# Patient Record
Sex: MALE | Race: WHITE | NOT HISPANIC OR LATINO | URBAN - METROPOLITAN AREA
[De-identification: names, ages, dates, MRNs, and addresses within clinical notes are randomized per-mention and may not be internally consistent; named-entity substitution may affect disease eponyms.]

---

## 2020-07-31 ENCOUNTER — COMMUNICATION - HEALTHEAST (OUTPATIENT)
Dept: SCHEDULING | Facility: CLINIC | Age: 75
End: 2020-07-31

## 2021-06-16 PROBLEM — J93.9 PNEUMOTHORAX: Status: ACTIVE | Noted: 2020-07-28

## 2025-05-15 ENCOUNTER — APPOINTMENT (OUTPATIENT)
Dept: RADIOLOGY | Facility: HOSPITAL | Age: 80
End: 2025-05-15
Attending: EMERGENCY MEDICINE
Payer: COMMERCIAL

## 2025-05-15 ENCOUNTER — HOSPITAL ENCOUNTER (EMERGENCY)
Facility: HOSPITAL | Age: 80
Discharge: HOME | End: 2025-05-15
Attending: EMERGENCY MEDICINE | Admitting: EMERGENCY MEDICINE
Payer: COMMERCIAL

## 2025-05-15 VITALS
DIASTOLIC BLOOD PRESSURE: 57 MMHG | RESPIRATION RATE: 18 BRPM | TEMPERATURE: 97.5 F | WEIGHT: 195 LBS | BODY MASS INDEX: 27.3 KG/M2 | OXYGEN SATURATION: 97 % | HEIGHT: 71 IN | HEART RATE: 87 BPM | SYSTOLIC BLOOD PRESSURE: 103 MMHG

## 2025-05-15 DIAGNOSIS — T84.021A DISLOCATION OF INTERNAL LEFT HIP PROSTHESIS, INITIAL ENCOUNTER: ICD-10-CM

## 2025-05-15 PROBLEM — J93.9 PNEUMOTHORAX: Status: RESOLVED | Noted: 2020-07-28 | Resolved: 2025-05-15

## 2025-05-15 LAB
HOLD SPECIMEN: NORMAL

## 2025-05-15 PROCEDURE — 999N000157 HC STATISTIC RCP TIME EA 10 MIN

## 2025-05-15 PROCEDURE — 999N000065 XR HIP PORTABLE LEFT 2-3 VIEWS

## 2025-05-15 PROCEDURE — 73502 X-RAY EXAM HIP UNI 2-3 VIEWS: CPT | Mod: 77

## 2025-05-15 PROCEDURE — 99285 EMERGENCY DEPT VISIT HI MDM: CPT | Mod: 25

## 2025-05-15 PROCEDURE — 99152 MOD SED SAME PHYS/QHP 5/>YRS: CPT

## 2025-05-15 PROCEDURE — 999N000185 HC STATISTIC TRANSPORT TIME EA 15 MIN

## 2025-05-15 PROCEDURE — 999N000065 XR HIP PORT LEFT 1 VIEW

## 2025-05-15 PROCEDURE — 250N000011 HC RX IP 250 OP 636: Performed by: EMERGENCY MEDICINE

## 2025-05-15 PROCEDURE — 27250 TREAT HIP DISLOCATION: CPT | Mod: LT

## 2025-05-15 PROCEDURE — 73502 X-RAY EXAM HIP UNI 2-3 VIEWS: CPT

## 2025-05-15 RX ORDER — PROPOFOL 10 MG/ML
1 INJECTION, EMULSION INTRAVENOUS ONCE
Status: COMPLETED | OUTPATIENT
Start: 2025-05-15 | End: 2025-05-15

## 2025-05-15 RX ADMIN — PROPOFOL 89 MG: 10 INJECTION, EMULSION INTRAVENOUS at 15:13

## 2025-05-15 ASSESSMENT — COLUMBIA-SUICIDE SEVERITY RATING SCALE - C-SSRS
6. HAVE YOU EVER DONE ANYTHING, STARTED TO DO ANYTHING, OR PREPARED TO DO ANYTHING TO END YOUR LIFE?: NO
2. HAVE YOU ACTUALLY HAD ANY THOUGHTS OF KILLING YOURSELF IN THE PAST MONTH?: NO
1. IN THE PAST MONTH, HAVE YOU WISHED YOU WERE DEAD OR WISHED YOU COULD GO TO SLEEP AND NOT WAKE UP?: NO

## 2025-05-15 ASSESSMENT — ACTIVITIES OF DAILY LIVING (ADL)
ADLS_ACUITY_SCORE: 41

## 2025-05-15 NOTE — ED PROVIDER NOTES
EMERGENCY DEPARTMENT ENCOUNTER      NAME: Jose Alejandro Kilpatrick  AGE: 79 year old male  YOB: 1945  MRN: [id number]  EVALUATION DATE & TIME: 5/15/2025  1:53 PM    PCP: Fortunato Alexander    ED PROVIDER: Luis E Mcdowell D.O.      Chief Complaint   Patient presents with    Hip Pain       FINAL IMPRESSION:  1. Dislocation of internal left hip prosthesis, initial encounter        ED COURSE & MEDICAL DECISION MAKIN:57 PM I met with the patient to gather history and to perform my initial exam. I discussed the plan for care while in the Emergency Department.  2:51 PM I went to gain consent for procedural sedation and reduction.   3:12 PM I performed the hip reduction procedure.  3:39 PM rechecked the patient and informed him of the repeat x-ray, patient will be discharged when recovered enough from the sedation that he can safely ambulate and leave under his own power.    ED Course as of 05/15/25 1540   Thu May 15, 2025   1459 XR Pelvis w Hip Left G/E 2 Views       Pertinent Labs & Imaging studies reviewed. (See chart for details)  79 year old male presents to the Emergency Department for evaluation of left hip pain status post mechanical fall.  Patient has had a previous left hip replacement surgery.  Initial concern for fracture versus dislocation.  X-rays do show dislocation without fracture, therefore the patient was sedated with propofol.  He was able to be simply reduced in the emergency department with dramatic improvement of symptoms as he woke from the sedation.  At this time, I believe the patient is appropriate for discharge with outpatient follow-up with his primary care provider and his orthopedic surgeon.  Patient was comfortable with this plan.  Return precautions were discussed.    Medical Decision Making  I obtained history from Family Member/Significant Other  Discharge. No recommendations on prescription strength medication(s). See documentation for any additional details.    MIPS (CTPE, Dental pain,  Romero, Sinusitis, Asthma/COPD, Head Trauma): Not Applicable    SEPSIS: None          At the conclusion of the encounter I discussed the results of all of the tests and the disposition. The questions were answered. The patient or family acknowledged understanding and was agreeable with the care plan.      HPI    Patient information was obtained from: patient    Use of : N/A       Jose Alejandro Kilpatrick is a 79 year old male who presents to this ED for evaluation of hip pain after a fall.    Patient states he was out in his yard putting golf balls into a bucket when he slipped on a golf ball and landed awkwardly on his left hip. Per nursing staff, patient's left hip is a replacement. Patient endorses pain in his left hip. He states that pain medications have helped a little bit with the pain. Patient did not hit his head or lose consciousness during the fall. Per his chart, he is not on blood thinners. Patient denies any neck pain or tenderness. There were no other concerns/complaints at this time.       PAST MEDICAL HISTORY:  Past Medical History:   Diagnosis Date    Hyperlipidemia     Hypertension     Osteoarthritis        PAST SURGICAL HISTORY:  Past Surgical History:   Procedure Laterality Date    KIDNEY STONE SURGERY      OTHER SURGICAL HISTORY Bilateral     knee replacement         CURRENT MEDICATIONS:    No current facility-administered medications for this encounter.     Current Outpatient Medications   Medication Sig Dispense Refill    acetaminophen (TYLENOL) 500 MG tablet [ACETAMINOPHEN (TYLENOL) 500 MG TABLET] Take 2 tablets (1,000 mg total) by mouth every 6 (six) hours as needed for pain.  0    atorvastatin (LIPITOR) 20 MG tablet [ATORVASTATIN (LIPITOR) 20 MG TABLET] Take 20 mg by mouth at bedtime.      fish oil-omega-3 fatty acids (FISH OIL) 300-1,000 mg capsule [FISH OIL-OMEGA-3 FATTY ACIDS (FISH OIL) 300-1,000 MG CAPSULE] Take 1 g by mouth daily.      lisinopriL (PRINIVIL,ZESTRIL) 20 MG tablet  "[LISINOPRIL (PRINIVIL,ZESTRIL) 20 MG TABLET] Take 20 mg by mouth daily.      loratadine (CLARITIN) 10 mg tablet [LORATADINE (CLARITIN) 10 MG TABLET] Take 10 mg by mouth daily.      melatonin 3 mg Tab tablet [MELATONIN 3 MG TAB TABLET] Take 6 mg by mouth at bedtime as needed.      MILK THISTLE ORAL [MILK THISTLE ORAL] Take 1 capsule by mouth daily.      oxyCODONE (ROXICODONE) 5 MG immediate release tablet [OXYCODONE (ROXICODONE) 5 MG IMMEDIATE RELEASE TABLET] Take 1 tablet (5 mg total) by mouth every 6 (six) hours as needed for pain. 10 tablet 0    sildenafiL (VIAGRA) 50 MG tablet [SILDENAFIL (VIAGRA) 50 MG TABLET] Take 50 mg by mouth daily as needed for erectile dysfunction.           ALLERGIES:  No Known Allergies    FAMILY HISTORY:  History reviewed. No pertinent family history.    SOCIAL HISTORY:  Social History     Socioeconomic History    Marital status:    Tobacco Use    Smoking status: Former     Current packs/day: 0.00     Average packs/day: 1 pack/day for 4.0 years (4.0 ttl pk-yrs)     Types: Cigarettes     Start date: 1968     Quit date: 1972     Years since quittin.8    Smokeless tobacco: Never   Substance and Sexual Activity    Alcohol use: Yes     Alcohol/week: 3.0 standard drinks of alcohol    Drug use: Never    Sexual activity: Yes     Partners: Female     Birth control/protection: None, Surgical       VITALS:  Patient Vitals for the past 24 hrs:   BP Temp Pulse Resp SpO2 Height Weight   05/15/25 1518 126/68 -- 86 23 99 % -- --   05/15/25 1515 121/67 -- 93 24 93 % -- --   05/15/25 1509 118/65 -- -- -- -- -- --   05/15/25 1507 -- -- 83 11 96 % -- --   05/15/25 1503 -- 97.5  F (36.4  C) -- 20 -- -- --   05/15/25 1401 -- -- -- -- -- 1.803 m (5' 11\") 88.5 kg (195 lb)   05/15/25 1400 (!) 144/96 -- 85 -- 98 % -- --       PHYSICAL EXAM    VITAL SIGNS: /68   Pulse 86   Temp 97.5  F (36.4  C)   Resp 23   Ht 1.803 m (5' 11\")   Wt 88.5 kg (195 lb)   SpO2 99%   BMI 27.20 kg/m  "     General Appearance: Well-appearing, well-nourished, no acute distress   Head:  Normocephalic, without obvious abnormality, atraumatic  Eyes:  PERRL, conjunctiva/corneas clear, EOM's intact,  ENT:  Lips, mucosa, and tongue normal, membranes are moist without pallor  Neck:  Normal ROM, symmetrical, trachea midline    Cardio:  Regular rate and rhythm, no murmur, rub or gallop, 2+ pulses symmetric in all extremities  Pulm:  Clear to auscultation bilaterally, respirations unlabored,  Musculoskeletal: Tenderness to palpation to the left hip, held in flexion, neurovascular intact. Full ROM, no edema, no cyanosis, good ROM of major joints  Integument:  Warm, Dry, No erythema, No rash.    Neurologic:  Alert & oriented.  No focal deficits appreciated.    Psychiatric:  Affect normal, Judgment normal, Mood normal.      LABS  Results for orders placed or performed during the hospital encounter of 05/15/25 (from the past 24 hours)   Matthews Draw    Narrative    The following orders were created for panel order Matthews Draw.  Procedure                               Abnormality         Status                     ---------                               -----------         ------                     Extra Blue Top Tube[[id number]]                              Final result               Extra Green Top (Lithiu...[[id number]]                      Final result               Extra Purple Top Tube[[id number]]                           Final result                 Please view results for these tests on the individual orders.   Extra Blue Top Tube   Result Value Ref Range    Hold Specimen JIC    Extra Green Top (Lithium Heparin) Tube   Result Value Ref Range    Hold Specimen JIC    Extra Purple Top Tube   Result Value Ref Range    Hold Specimen JIC    XR Pelvis w Hip Left G/E 2 Views    Narrative    EXAM: XR PELVIS AND HIP LEFT 2 VIEWS  LOCATION: [facility]  DATE: 5/15/2025    INDICATION: Trauma,  pain  COMPARISON: None.      Impression    IMPRESSION: Postoperative changes left hip arthroplasty. Superolateral dislocation of left hip. No evidence for fracture. Right hip negative for fracture. Degenerative change. Pelvis negative for fracture. Degenerative change at the STT joints   bilaterally.   Hip XR left, 1 vw PORTABLE    Narrative    EXAM: XR HIP PORT LEFT 1 VIEW  LOCATION: [facility]  DATE: 5/15/2025    INDICATION: Postreduction evaluation.  COMPARISON: 5/15/2025.      Impression    IMPRESSION: Interval relocation of the left total hip arthroplasty, now with anatomic position and alignment. No evidence of acute periprosthetic fracture, hardware loosening, or other new complication.         RADIOLOGY  Hip XR left, 1 vw PORTABLE   Final Result   IMPRESSION: Interval relocation of the left total hip arthroplasty, now with anatomic position and alignment. No evidence of acute periprosthetic fracture, hardware loosening, or other new complication.      XR Pelvis w Hip Left G/E 2 Views   Final Result   IMPRESSION: Postoperative changes left hip arthroplasty. Superolateral dislocation of left hip. No evidence for fracture. Right hip negative for fracture. Degenerative change. Pelvis negative for fracture. Degenerative change at the STT joints    bilaterally.           I have independently interpreted the above image, initial x-ray of the left hip shows obvious dislocation of the prosthesis, with interval reduction on the post reduction film without evidence of fracture. See radiology report for detail.        PROCEDURES:  PROCEDURE: Procedural Sedation  Orthopedic Injury Reduction   INDICATIONS: Sedation is required to allow for joint reduction (left hip)   SEDATION PROVIDER: Dr Luis E Mcdowell   PROCEDURE PROVIDER: Dr Luis E Mcdowell   LEVEL OF SEDATION: Moderate Sedation    Defined as:  Minimal = Normal response to verbal  Moderate = Responds to verbal and light tactile stimulation  Deep =  Responds after repeated painful stimulation   CONSENT: Risks, benefits and alternatives were discussed with and Verbal consent was obtained from Patient.   PROCEDURE SPECIFIC CHECKLIST COMPLETED:   Yes   LAST ORAL INTAKE: Clear Liquids >2 hours   ASA CLASS: 2 - Mild systemic disease   MALLAMPATI:  II - Faucial pillars and soft palate may be seen, but uvula is masked by the base of the tongue   TIME OUT: Universal protocol was followed. TIME OUT conducted just prior to starting procedure confirmed patient identity, site/side, procedure, patient position, and availability of correct equipment. Yes    Immediately prior to initiation of sedation, reassessment of clinical condition was performed which was unchanged.   MEDICATIONS: Propofol, 100 mg, IV   MONITORING: Monitoring consisted of:   heart rate, cardiac monitor, continuous pulse oximeter, continuous capnometry (end tidal CO2), frequent blood pressure checks, level of consciousness checks, IV access, constant attendance by RN until patient is recovered, and constant attendance by MD until patient is stable   RESPONSE: vital signs stable, airway patent, and desaturations (note interventions)   REDUCTION   PROCEDURE DESCRIPTION: ORTHOPEDIC MANAGEMENT:  Bone/Joint Manipulation: Affected extremity was grasped and gradual traction was applied and was further manipulated manually until alignment and positioning were improved.      POST-SEDATION ASSESSMENT/NOTE: Lowest level oxygen saturation reached was 88%.    Post procedure patient was alert and responds to verbal stimuli    Patient was monitored during recovery and returned to pre-procedure baseline.   ADDITIONAL MD ASSISTANCE: None   TOTAL MD DRUG ADMINISTRATION / MONITORING TIME: 20 minutes   COMPLICATIONS:  Patient tolerated procedure well, without complication            MEDICATIONS GIVEN IN THE EMERGENCY:  Medications   propofol (DIPRIVAN) injection 10 mg/mL vial (89 mg Intravenous $Given 5/15/25 6776)       NEW  PRESCRIPTIONS STARTED AT TODAY'S ER VISIT  New Prescriptions    No medications on file        I, Ion Zayas, am serving as a scribe to document services personally performed by Luis E Mcdowell D.O., based on my observations and the provider's statements to me.  I, Luis E Mcdowell D.O., attest that Ion Zayas is acting in a scribe capacity, has observed my performance of the services and has documented them in accordance with my direction.     Luis E Mcdowell D.O.  Emergency Medicine  [facility] EMERGENCY DEPARTMENT  [address] [place] [id number]  [phone]  Dept: [phone]       Luis E Mcdowell,   05/15/25 1833

## 2025-05-15 NOTE — ED TRIAGE NOTES
Patient come from home via [facility] where he fell on a golf ball in the backyard. Left hip was recently replaced. Left leg Visually shorted and painful. CMS intact      Triage Assessment (Adult)       Row Name 05/15/25 1402          Triage Assessment    Airway WDL WDL        Respiratory WDL    Respiratory WDL WDL        Skin Circulation/Temperature WDL    Skin Circulation/Temperature WDL WDL        Cardiac WDL    Cardiac WDL WDL        Peripheral/Neurovascular WDL    Peripheral Neurovascular WDL WDL        Cognitive/Neuro/Behavioral WDL    Cognitive/Neuro/Behavioral WDL WDL

## 2025-05-15 NOTE — ED NOTES
Bed: [place] [address]  Expected date:   Expected time:   Means of arrival:   Comments:  Fall/left hip injury/79 male

## 2025-05-16 PROBLEM — E78.5 HYPERLIPIDEMIA: Status: ACTIVE | Noted: 2025-05-15

## 2025-05-16 PROBLEM — R41.89 IMPAIRED COGNITION: Status: ACTIVE | Noted: 2025-05-16

## 2025-05-16 PROBLEM — F10.920 ALCOHOLIC INTOXICATION WITHOUT COMPLICATION: Status: ACTIVE | Noted: 2025-05-15

## 2025-05-16 PROBLEM — G47.30 SLEEP APNEA: Status: ACTIVE | Noted: 2025-05-16

## 2025-05-16 PROBLEM — G47.33 OSA ON CPAP: Chronic | Status: ACTIVE | Noted: 2025-05-16

## 2025-05-16 PROBLEM — E78.5 HYPERLIPIDEMIA: Chronic | Status: ACTIVE | Noted: 2025-05-15

## 2025-05-16 PROBLEM — S73.035D: Status: ACTIVE | Noted: 2025-05-16
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
## 2025-05-16 PROBLEM — G31.84 MILD COGNITIVE IMPAIRMENT: Chronic | Status: ACTIVE | Noted: 2025-05-16

## 2025-05-16 PROBLEM — E78.2 MIXED HYPERLIPIDEMIA: Chronic | Status: ACTIVE | Noted: 2025-05-15

## 2025-05-16 PROBLEM — F43.22 ADJUSTMENT DISORDER WITH ANXIETY: Chronic | Status: ACTIVE | Noted: 2025-05-15

## 2025-05-16 PROBLEM — Z96.642 PRESENCE OF LEFT ARTIFICIAL HIP JOINT: Chronic | Status: ACTIVE | Noted: 2025-05-16

## 2025-05-16 PROBLEM — Z96.642 PRESENCE OF LEFT ARTIFICIAL HIP JOINT: Status: ACTIVE | Noted: 2025-05-16

## 2025-05-16 PROBLEM — M24.452: Status: ACTIVE | Noted: 2025-05-16

## 2025-05-16 PROBLEM — N20.0 CALCULUS OF KIDNEY: Status: RESOLVED | Noted: 2025-05-15 | Resolved: 2025-05-15

## 2025-05-16 PROBLEM — I10 ESSENTIAL (PRIMARY) HYPERTENSION: Chronic | Status: ACTIVE | Noted: 2025-05-15

## 2025-05-16 PROBLEM — F43.22 ADJUSTMENT DISORDER WITH ANXIETY: Status: ACTIVE | Noted: 2025-05-15

## 2025-05-16 PROBLEM — F10.10 ALCOHOL ABUSE, UNCOMPLICATED: Status: ACTIVE | Noted: 2025-05-15

## 2025-05-16 PROBLEM — L20.9 ATOPIC DERMATITIS: Status: RESOLVED | Noted: 2025-05-15 | Resolved: 2025-05-15

## 2025-05-16 PROBLEM — I10 ESSENTIAL (PRIMARY) HYPERTENSION: Status: ACTIVE | Noted: 2025-05-15

## 2025-05-18 ENCOUNTER — PATIENT OUTREACH (OUTPATIENT)
Dept: CARE COORDINATION | Facility: CLINIC | Age: 80
End: 2025-05-18

## 2025-05-18 NOTE — PROGRESS NOTES
[facility]    Background: Transitional Care Management program identified per system criteria and reviewed by [facility] team for possible outreach.    Assessment: Upon chart review, [facility] Team member will not proceed with patient outreach related to this episode of Transitional Care Management program due to reason below:    Patient declined to answer all post hospital discharge questions with [facility] team member and disconnected call.    Plan: Transitional Care Management episode addressed appropriately per reason noted above.      REYNA Shepherd  [facility]    *[facility] Care Resource Team does NOT follow patient ongoing. Referrals are identified based on internal discharge reports and the outreach is to ensure patient has an understanding of their discharge instructions.